# Patient Record
Sex: MALE | Race: BLACK OR AFRICAN AMERICAN | Employment: UNEMPLOYED | URBAN - METROPOLITAN AREA
[De-identification: names, ages, dates, MRNs, and addresses within clinical notes are randomized per-mention and may not be internally consistent; named-entity substitution may affect disease eponyms.]

---

## 2018-10-29 ENCOUNTER — APPOINTMENT (OUTPATIENT)
Dept: CT IMAGING | Age: 23
DRG: 176 | End: 2018-10-29

## 2018-10-29 ENCOUNTER — HOSPITAL ENCOUNTER (INPATIENT)
Age: 23
LOS: 2 days | Discharge: HOME | DRG: 176 | End: 2018-10-31
Attending: INTERNAL MEDICINE | Admitting: INTERNAL MEDICINE
Payer: MEDICAID

## 2018-10-29 DIAGNOSIS — I26.99 OTHER ACUTE PULMONARY EMBOLISM WITHOUT ACUTE COR PULMONALE (HCC): Primary | ICD-10-CM

## 2018-10-29 PROBLEM — R00.2 PALPITATIONS: Status: ACTIVE | Noted: 2018-10-29

## 2018-10-29 PROBLEM — R04.2 HEMOPTYSIS: Status: ACTIVE | Noted: 2018-10-29

## 2018-10-29 LAB
ANION GAP SERPL CALCULATED.3IONS-SCNC: 13 MMOL/L (ref 7–16)
BASOPHILS ABSOLUTE: 0.04 E9/L (ref 0–0.2)
BASOPHILS RELATIVE PERCENT: 0.5 % (ref 0–2)
BUN BLDV-MCNC: 12 MG/DL (ref 6–20)
CALCIUM SERPL-MCNC: 9.8 MG/DL (ref 8.6–10.2)
CHLORIDE BLD-SCNC: 98 MMOL/L (ref 98–107)
CO2: 27 MMOL/L (ref 22–29)
CREAT SERPL-MCNC: 1 MG/DL (ref 0.7–1.2)
D DIMER: 1596 NG/ML DDU
EOSINOPHILS ABSOLUTE: 0.18 E9/L (ref 0.05–0.5)
EOSINOPHILS RELATIVE PERCENT: 2.4 % (ref 0–6)
GFR AFRICAN AMERICAN: >60
GFR NON-AFRICAN AMERICAN: >60 ML/MIN/1.73
GLUCOSE BLD-MCNC: 90 MG/DL (ref 74–109)
HCT VFR BLD CALC: 46.2 % (ref 37–54)
HEMOGLOBIN: 14.5 G/DL (ref 12.5–16.5)
IMMATURE GRANULOCYTES #: 0.01 E9/L
IMMATURE GRANULOCYTES %: 0.1 % (ref 0–5)
LYMPHOCYTES ABSOLUTE: 2.89 E9/L (ref 1.5–4)
LYMPHOCYTES RELATIVE PERCENT: 38.6 % (ref 20–42)
MCH RBC QN AUTO: 23.1 PG (ref 26–35)
MCHC RBC AUTO-ENTMCNC: 31.4 % (ref 32–34.5)
MCV RBC AUTO: 73.7 FL (ref 80–99.9)
MONOCYTES ABSOLUTE: 0.5 E9/L (ref 0.1–0.95)
MONOCYTES RELATIVE PERCENT: 6.7 % (ref 2–12)
NEUTROPHILS ABSOLUTE: 3.86 E9/L (ref 1.8–7.3)
NEUTROPHILS RELATIVE PERCENT: 51.7 % (ref 43–80)
PDW BLD-RTO: 17.2 FL (ref 11.5–15)
PLATELET # BLD: 243 E9/L (ref 130–450)
PMV BLD AUTO: 10 FL (ref 7–12)
POTASSIUM SERPL-SCNC: 3.9 MMOL/L (ref 3.5–5)
RBC # BLD: 6.27 E12/L (ref 3.8–5.8)
SEDIMENTATION RATE, ERYTHROCYTE: 13 MM/HR (ref 0–15)
SODIUM BLD-SCNC: 138 MMOL/L (ref 132–146)
WBC # BLD: 7.5 E9/L (ref 4.5–11.5)

## 2018-10-29 PROCEDURE — 71275 CT ANGIOGRAPHY CHEST: CPT

## 2018-10-29 PROCEDURE — 85651 RBC SED RATE NONAUTOMATED: CPT

## 2018-10-29 PROCEDURE — 99285 EMERGENCY DEPT VISIT HI MDM: CPT

## 2018-10-29 PROCEDURE — 80048 BASIC METABOLIC PNL TOTAL CA: CPT

## 2018-10-29 PROCEDURE — 99223 1ST HOSP IP/OBS HIGH 75: CPT | Performed by: INTERNAL MEDICINE

## 2018-10-29 PROCEDURE — 93005 ELECTROCARDIOGRAM TRACING: CPT

## 2018-10-29 PROCEDURE — 85378 FIBRIN DEGRADE SEMIQUANT: CPT

## 2018-10-29 PROCEDURE — 6360000004 HC RX CONTRAST MEDICATION: Performed by: RADIOLOGY

## 2018-10-29 PROCEDURE — 2060000000 HC ICU INTERMEDIATE R&B

## 2018-10-29 PROCEDURE — 85025 COMPLETE CBC W/AUTO DIFF WBC: CPT

## 2018-10-29 PROCEDURE — 6370000000 HC RX 637 (ALT 250 FOR IP)

## 2018-10-29 RX ORDER — HYDROCODONE BITARTRATE AND ACETAMINOPHEN 5; 325 MG/1; MG/1
TABLET ORAL
Status: COMPLETED
Start: 2018-10-29 | End: 2018-10-29

## 2018-10-29 RX ORDER — HYDROCODONE BITARTRATE AND ACETAMINOPHEN 5; 325 MG/1; MG/1
1 TABLET ORAL ONCE
Status: COMPLETED | OUTPATIENT
Start: 2018-10-29 | End: 2018-10-29

## 2018-10-29 RX ORDER — HYDROCODONE BITARTRATE AND ACETAMINOPHEN 10; 325 MG/1; MG/1
1 TABLET ORAL EVERY 6 HOURS PRN
Status: DISCONTINUED | OUTPATIENT
Start: 2018-10-29 | End: 2018-10-31 | Stop reason: HOSPADM

## 2018-10-29 RX ADMIN — HYDROCODONE BITARTRATE AND ACETAMINOPHEN 1 TABLET: 5; 325 TABLET ORAL at 21:48

## 2018-10-29 RX ADMIN — IOPAMIDOL 70 ML: 755 INJECTION, SOLUTION INTRAVENOUS at 21:07

## 2018-10-29 ASSESSMENT — PAIN DESCRIPTION - ORIENTATION
ORIENTATION: RIGHT;MID
ORIENTATION: LEFT

## 2018-10-29 ASSESSMENT — PAIN DESCRIPTION - PAIN TYPE
TYPE: ACUTE PAIN
TYPE: ACUTE PAIN

## 2018-10-29 ASSESSMENT — PAIN SCALES - GENERAL
PAINLEVEL_OUTOF10: 5
PAINLEVEL_OUTOF10: 7
PAINLEVEL_OUTOF10: 7

## 2018-10-29 ASSESSMENT — PAIN DESCRIPTION - LOCATION
LOCATION: CHEST
LOCATION: RIB CAGE

## 2018-10-29 NOTE — ED PROVIDER NOTES
ED Attending  CC: Dayana         Department of Emergency Medicine   ED  Provider Note  Admit Date/RoomTime: 10/29/2018  7:41 PM  ED Room: 21/21  Chief Complaint   Rib Pain (left sided has been sick and coughing )    History of Present Illness   Source of history provided by:  patient. History/Exam Limitations: none. Irene Walter is a 26 y.o. old male who has a past medical history of: History reviewed. No pertinent past medical history. presents to the emergency department by private vehicle, for sharp left chest, anterior chest and left lateral chest pain which occured a few day(s) prior to arrival.  Patient states he initially had upper respiratory infection type symptoms a few days ago, which have resolved. He states for the past 2 days, he has noted pain with inspiration in the left anterior and left lateral chest wall. He also reports a few episodes of hemoptysis. He states he is also noted a feeling that his heart is \"racing. \" He reports shortness of breath with exertion. He states yesterday while playing basketball, he injured his left shoulder, and the pain has been worse since then. He also reports mild lightheadedness but denies syncope. He does admit to regular tobacco and marijuana use. ROS    Pertinent positives and negatives are stated within HPI, all other systems reviewed and are negative. Past Surgical History:  has no past surgical history on file. Social History:  reports that he has been smoking Cigarettes. He has never used smokeless tobacco. He reports that he does not drink alcohol or use drugs. Family History: family history is not on file. Allergies: Patient has no known allergies. Allergies: Patient has no known allergies.     Physical Exam           ED Triage Vitals [10/29/18 1940]   BP Temp Temp Source Pulse Resp SpO2 Height Weight   129/81 98.2 °F (36.8 °C) Oral 101 16 98 % 5' 11\" (1.803 m) 190 lb (86.2 kg)      Oxygen Saturation Interpretation: Normal.    Constitutional:  Alert, development consistent with age. HEENT:  NC/NT. Airway patent. Neck:  Normal ROM. Supple. Respiratory:  Clear to auscultation and breath sounds equal.  CV:  Regular rate and rhythm, normal heart sounds, without pathological murmurs, ectopy, gallops, or rubs. Chest:  left chest not tender to palpation, which does not reproduce pain. Crepitance: No.          Skin:  Without swelling, erythema or lesions noted. GI:  Abdomen Soft, nontender, good bowel sounds. No firm or pulsatile mass. Integument:  Normal turgor. Warm, dry, without visible rash, unless noted elsewhere. Extremities: No tenderness or edema noted. Lymphatic: no lymphadenopathy noted  Neurological:  Oriented. Motor functions intact.     Lab / Imaging Results   (All laboratory and radiology results have been personally reviewed by myself)  Labs:  Results for orders placed or performed during the hospital encounter of 10/29/18   CBC Auto Differential   Result Value Ref Range    WBC 7.5 4.5 - 11.5 E9/L    RBC 6.27 (H) 3.80 - 5.80 E12/L    Hemoglobin 14.5 12.5 - 16.5 g/dL    Hematocrit 46.2 37.0 - 54.0 %    MCV 73.7 (L) 80.0 - 99.9 fL    MCH 23.1 (L) 26.0 - 35.0 pg    MCHC 31.4 (L) 32.0 - 34.5 %    RDW 17.2 (H) 11.5 - 15.0 fL    Platelets 505 291 - 547 E9/L    MPV 10.0 7.0 - 12.0 fL    Neutrophils % 51.7 43.0 - 80.0 %    Immature Granulocytes % 0.1 0.0 - 5.0 %    Lymphocytes % 38.6 20.0 - 42.0 %    Monocytes % 6.7 2.0 - 12.0 %    Eosinophils % 2.4 0.0 - 6.0 %    Basophils % 0.5 0.0 - 2.0 %    Neutrophils # 3.86 1.80 - 7.30 E9/L    Immature Granulocytes # 0.01 E9/L    Lymphocytes # 2.89 1.50 - 4.00 E9/L    Monocytes # 0.50 0.10 - 0.95 E9/L    Eosinophils # 0.18 0.05 - 0.50 E9/L    Basophils # 0.04 0.00 - 0.20 V5/I   Basic Metabolic Panel   Result Value Ref Range    Sodium 138 132 - 146 mmol/L    Potassium 3.9 3.5 - 5.0 mmol/L    Chloride 98 98 - 107 mmol/L    CO2 27 22 - 29 mmol/L    Anion Gap 13 7 - 16

## 2018-10-30 ENCOUNTER — APPOINTMENT (OUTPATIENT)
Dept: ULTRASOUND IMAGING | Age: 23
DRG: 176 | End: 2018-10-30

## 2018-10-30 LAB
ANION GAP SERPL CALCULATED.3IONS-SCNC: 12 MMOL/L (ref 7–16)
APTT: 23.2 SEC (ref 24.5–35.1)
BASOPHILS ABSOLUTE: 0.03 E9/L (ref 0–0.2)
BASOPHILS RELATIVE PERCENT: 0.6 % (ref 0–2)
BUN BLDV-MCNC: 13 MG/DL (ref 6–20)
CALCIUM SERPL-MCNC: 9.4 MG/DL (ref 8.6–10.2)
CHLORIDE BLD-SCNC: 100 MMOL/L (ref 98–107)
CO2: 25 MMOL/L (ref 22–29)
CREAT SERPL-MCNC: 0.9 MG/DL (ref 0.7–1.2)
EOSINOPHILS ABSOLUTE: 0.34 E9/L (ref 0.05–0.5)
EOSINOPHILS RELATIVE PERCENT: 6.7 % (ref 0–6)
FIBRINOGEN: >700 MG/DL (ref 225–540)
GFR AFRICAN AMERICAN: >60
GFR NON-AFRICAN AMERICAN: >60 ML/MIN/1.73
GLUCOSE BLD-MCNC: 109 MG/DL (ref 74–109)
HCT VFR BLD CALC: 45.9 % (ref 37–54)
HEMOGLOBIN: 14.6 G/DL (ref 12.5–16.5)
IMMATURE GRANULOCYTES #: 0.01 E9/L
IMMATURE GRANULOCYTES %: 0.2 % (ref 0–5)
INR BLD: 1.1
LYMPHOCYTES ABSOLUTE: 2.53 E9/L (ref 1.5–4)
LYMPHOCYTES RELATIVE PERCENT: 49.8 % (ref 20–42)
MCH RBC QN AUTO: 23.3 PG (ref 26–35)
MCHC RBC AUTO-ENTMCNC: 31.8 % (ref 32–34.5)
MCV RBC AUTO: 73.2 FL (ref 80–99.9)
MONOCYTES ABSOLUTE: 0.49 E9/L (ref 0.1–0.95)
MONOCYTES RELATIVE PERCENT: 9.6 % (ref 2–12)
NEUTROPHILS ABSOLUTE: 1.68 E9/L (ref 1.8–7.3)
NEUTROPHILS RELATIVE PERCENT: 33.1 % (ref 43–80)
PDW BLD-RTO: 17.2 FL (ref 11.5–15)
PLATELET # BLD: 240 E9/L (ref 130–450)
PMV BLD AUTO: 10 FL (ref 7–12)
POTASSIUM SERPL-SCNC: 3.6 MMOL/L (ref 3.5–5)
PROTHROMBIN TIME: 12.6 SEC (ref 9.3–12.4)
RBC # BLD: 6.27 E12/L (ref 3.8–5.8)
SODIUM BLD-SCNC: 137 MMOL/L (ref 132–146)
WBC # BLD: 5.1 E9/L (ref 4.5–11.5)

## 2018-10-30 PROCEDURE — 81291 MTHFR GENE: CPT

## 2018-10-30 PROCEDURE — 6370000000 HC RX 637 (ALT 250 FOR IP): Performed by: STUDENT IN AN ORGANIZED HEALTH CARE EDUCATION/TRAINING PROGRAM

## 2018-10-30 PROCEDURE — 6360000002 HC RX W HCPCS: Performed by: INTERNAL MEDICINE

## 2018-10-30 PROCEDURE — 85384 FIBRINOGEN ACTIVITY: CPT

## 2018-10-30 PROCEDURE — 85303 CLOT INHIBIT PROT C ACTIVITY: CPT

## 2018-10-30 PROCEDURE — 85305 CLOT INHIBIT PROT S TOTAL: CPT

## 2018-10-30 PROCEDURE — 2060000000 HC ICU INTERMEDIATE R&B

## 2018-10-30 PROCEDURE — 6370000000 HC RX 637 (ALT 250 FOR IP): Performed by: INTERNAL MEDICINE

## 2018-10-30 PROCEDURE — 85025 COMPLETE CBC W/AUTO DIFF WBC: CPT

## 2018-10-30 PROCEDURE — 93308 TTE F-UP OR LMTD: CPT

## 2018-10-30 PROCEDURE — 86147 CARDIOLIPIN ANTIBODY EA IG: CPT

## 2018-10-30 PROCEDURE — 80048 BASIC METABOLIC PNL TOTAL CA: CPT

## 2018-10-30 PROCEDURE — 81400 MOPATH PROCEDURE LEVEL 1: CPT

## 2018-10-30 PROCEDURE — 85613 RUSSELL VIPER VENOM DILUTED: CPT

## 2018-10-30 PROCEDURE — 86146 BETA-2 GLYCOPROTEIN ANTIBODY: CPT

## 2018-10-30 PROCEDURE — 99233 SBSQ HOSP IP/OBS HIGH 50: CPT | Performed by: INTERNAL MEDICINE

## 2018-10-30 PROCEDURE — 81240 F2 GENE: CPT

## 2018-10-30 PROCEDURE — 81241 F5 GENE: CPT

## 2018-10-30 PROCEDURE — 85300 ANTITHROMBIN III ACTIVITY: CPT

## 2018-10-30 PROCEDURE — 85730 THROMBOPLASTIN TIME PARTIAL: CPT

## 2018-10-30 PROCEDURE — 36415 COLL VENOUS BLD VENIPUNCTURE: CPT

## 2018-10-30 PROCEDURE — 85610 PROTHROMBIN TIME: CPT

## 2018-10-30 PROCEDURE — 93970 EXTREMITY STUDY: CPT

## 2018-10-30 RX ADMIN — HYDROCODONE BITARTRATE AND ACETAMINOPHEN 1 TABLET: 10; 325 TABLET ORAL at 03:00

## 2018-10-30 RX ADMIN — HYDROCODONE BITARTRATE AND ACETAMINOPHEN 1 TABLET: 10; 325 TABLET ORAL at 04:33

## 2018-10-30 RX ADMIN — APIXABAN 10 MG: 5 TABLET, FILM COATED ORAL at 20:55

## 2018-10-30 RX ADMIN — HYDROCODONE BITARTRATE AND ACETAMINOPHEN 1 TABLET: 10; 325 TABLET ORAL at 20:05

## 2018-10-30 RX ADMIN — ENOXAPARIN SODIUM 90 MG: 100 INJECTION SUBCUTANEOUS at 08:40

## 2018-10-30 ASSESSMENT — PAIN DESCRIPTION - PAIN TYPE: TYPE: ACUTE PAIN

## 2018-10-30 ASSESSMENT — PAIN DESCRIPTION - ONSET: ONSET: ON-GOING

## 2018-10-30 ASSESSMENT — PAIN SCALES - GENERAL
PAINLEVEL_OUTOF10: 9
PAINLEVEL_OUTOF10: 7
PAINLEVEL_OUTOF10: 7
PAINLEVEL_OUTOF10: 2
PAINLEVEL_OUTOF10: 7

## 2018-10-30 ASSESSMENT — PAIN DESCRIPTION - PROGRESSION: CLINICAL_PROGRESSION: NOT CHANGED

## 2018-10-30 ASSESSMENT — PAIN DESCRIPTION - FREQUENCY: FREQUENCY: CONTINUOUS

## 2018-10-30 ASSESSMENT — PAIN DESCRIPTION - LOCATION: LOCATION: BACK;SHOULDER

## 2018-10-30 ASSESSMENT — PAIN DESCRIPTION - DESCRIPTORS: DESCRIPTORS: ACHING;CONSTANT;DISCOMFORT

## 2018-10-30 ASSESSMENT — PAIN DESCRIPTION - ORIENTATION: ORIENTATION: RIGHT;LEFT;MID

## 2018-10-30 NOTE — CONSULTS
Value Date     10/30/2018    K 3.6 10/30/2018     10/30/2018    CO2 25 10/30/2018    BUN 13 10/30/2018    CREATININE 0.9 10/30/2018    CALCIUM 9.4 10/30/2018    GFRAA >60 10/30/2018    LABGLOM >60 10/30/2018     Lab Results   Component Value Date    PROTIME 12.6 10/30/2018    INR 1.1 10/30/2018           Assessment    Active Problems:    Other pulmonary embolism without acute cor pulmonale (HCC)    Palpitations    Hemoptysis    Pulmonary infarction ([address]    Multiple pulmonary emboli (HCC)  Resolved Problems:    * No resolved hospital problems. *    Plan    - Started on therapeutic Lovenox - switched regimen to a Eliquis 10 mg BID x 7 days followed by 5 mg BID for long-term anticoagulation  - DVT ultrasound - negative  - Follow up echo results to rule out pulmonary hypertension  - Follow-up genetic testing results for coagulopathies  - Pain management per primary  - Incentive spirometry    Discussed case with Ruffin Sever, MD  Family Medicine, PGY-2  10/30/2018   5:16 PM      Thank you for allowing me to participate in the care of [facility]. I personally saw, examined, and cared for the patient. Labs, medications, radiographs reviewed. I agree with history exam and plans detailed in Medical Student note.   Alfa Dee D.O.

## 2018-10-30 NOTE — H&P
No results found for: CKTOTAL, CKMB, CKMBINDEX, [address]    Radiology: Cta Pulmonary W Contrast    Result Date: 10/29/2018  Patient MRN: [id number] : 1995 Age:  26 years Order Date: 10/29/2018 8:00 PM Gender: Male Exam: CTA PULMONARY W CONTRAST. No 3-D postprocessing was performed. Number of Images: 417 Indication:   Rule out pulmonary embolism Comparison: None. Findings: This examination was performed on a CT scanner with dose reduction. Technique: Low-dose CT  acquisition technique included one of following options; 1 . Automated exposure control, 2. Adjustment of MA and or KV according to patient's size or 3. Use of iterative reconstruction. There are multiple areas of pulmonary artery embolus/thrombus extending toward the right anterior lung, the right midlung and the right posterior and lower lung. Multiple areas of pulmonary artery/thrombus extending toward the left anterior lung, left midlung and left posterior and lower lung. No right ventricular strain definitively identified at this time. No supraclavicular, axillary, or mediastinal lymphadenopathy by CT size criteria. Soft tissue density in the anterior prevascular space likely reflective of residual thymic tissue given the patient's age. No definitive precarinal or hilar abnormal enlarged lymphadenopathy is identified. Visualized portions of the gallbladder, liver, esophagus, stomach, spleen, accessory spleen, adrenals, left kidney and right kidney and the pancreas, pancreatic duct and extrahepatic common bile duct that are visualized are unremarkable. Vertebral body heights and intervertebral disc spaces are well-preserved. There is normal sagittal alignment of the visualized spine. No lytic or blastic bony lesions. Thyroid is grossly unremarkable. Right lun. No focal area of infiltrate/pneumonia, pneumothorax or pleural effusion is identified. 2. No discrete noncalcified pulmonary nodule or spiculated mass identified. Left lun.  No focal area of infiltrate/pneumonia, pneumothorax or pleural effusion. 2. There is an area of airspace opacification which measures approximately 3.9 cm anterior posterior by 3.3 cm transverse by approximately 2.4 cm craniocaudal noted in the left lower lung and costophrenic angle laterally. ALERT:  THIS IS AN ABNORMAL REPORT. Findings communicated directly with Isabel Araujo on 10/29/2018 at approximately 9:17 PM, and Dr. Dennis Kauffman at approximately 2121 hours on 12/29/2018. 1. Multiple pulmonary emboli/thrombus noted within multiple pulmonary arteries involving the right lung pulmonary vasculature and the left lung pulmonary vasculature extending toward the bilateral anterior, bilateral mid and bilateral posterior lungs. No right ventricular strain identify the time of the exam. 2. Area of airspace opacification which is pleural-based in the inferior left lateral lower lobe which could be suggestive of a very parenchymal infarction. Continued follow-up to complete resolution is recommended. 3. Findings suggestive of residual thymic tissue in the anterior prevascular space. EKG:  Normal sinus rhythm  Possible Left atrial enlargement  Borderline ECG  No previous ECGs available   Assessment & Plan   ACTIVE hospital problems being addressed/reassessed for this admission:  Active Problems:    Other pulmonary embolism without acute cor pulmonale (HCC)    Palpitations    Hemoptysis    Pulmonary infarction [facility])  Resolved Problems:    * No resolved hospital problems. *    Code status/DVT prophylaxis and PLAN --see orders   Note extensive time spent coordinating care between ER docs, ER and floor nurses, and transitioning care over to day providers  Plan of care/ clinical impressions/communication specifics detailed below:    No family hx of blood clots or risk factors  No DVT symptoms LE-- no fevers or endocardities and esr N.   Acute PE multiple with pleuritic like-- pulm infarction pain and hemoptosis over past

## 2018-10-30 NOTE — CARE COORDINATION
10/30/2018  Social Work Discharge Planning:SW left a voicemail with  Nick Gong to inform that Pt has no health ins. MIGUE also gave Pt the contact info. for PCP pre-service-[phone]-, to set up an appointment with a PCP. Electronically signed by FARIDEH Garcia on 10/30/2018 at 11:02 AM     10/30/2018  Social Work Discharge Planning:  MIGUE was notified by Debbie Oscar. Adv. that Pt will be signing up for medicaid and is eligible for medication assistance. Electronically signed by FARIDEH Garcia on 10/30/2018 at 11:35 AM

## 2018-10-31 ENCOUNTER — APPOINTMENT (OUTPATIENT)
Dept: CT IMAGING | Age: 23
DRG: 176 | End: 2018-10-31

## 2018-10-31 VITALS
OXYGEN SATURATION: 98 % | HEART RATE: 93 BPM | WEIGHT: 189.6 LBS | DIASTOLIC BLOOD PRESSURE: 78 MMHG | HEIGHT: 71 IN | RESPIRATION RATE: 16 BRPM | BODY MASS INDEX: 26.54 KG/M2 | SYSTOLIC BLOOD PRESSURE: 129 MMHG | TEMPERATURE: 97.9 F

## 2018-10-31 LAB
ANION GAP SERPL CALCULATED.3IONS-SCNC: 12 MMOL/L (ref 7–16)
AT-III ACTIVITY: 91 % ACTIVITY (ref 83–121)
BUN BLDV-MCNC: 12 MG/DL (ref 6–20)
CALCIUM SERPL-MCNC: 9.4 MG/DL (ref 8.6–10.2)
CHLORIDE BLD-SCNC: 98 MMOL/L (ref 98–107)
CO2: 26 MMOL/L (ref 22–29)
CREAT SERPL-MCNC: 0.9 MG/DL (ref 0.7–1.2)
GFR AFRICAN AMERICAN: >60
GFR NON-AFRICAN AMERICAN: >60 ML/MIN/1.73
GLUCOSE BLD-MCNC: 106 MG/DL (ref 74–109)
HCT VFR BLD CALC: 47 % (ref 37–54)
IMMATURE RETIC FRACT: 14.6 % (ref 2.3–13.4)
LUPUS ANTICOAG DVVT: ABNORMAL
POTASSIUM SERPL-SCNC: 4 MMOL/L (ref 3.5–5)
PROTEIN C ACTIVITY: 84 % ACTIVITY (ref 68–165)
RETIC HGB EQUIVALENT: 26.5 PG (ref 28.2–36.6)
RETICULOCYTE ABSOLUTE COUNT: 0.05 E12/L
RETICULOCYTE COUNT PCT: 0.9 % (ref 0.4–1.9)
SODIUM BLD-SCNC: 136 MMOL/L (ref 132–146)

## 2018-10-31 PROCEDURE — APPSS30 APP SPLIT SHARED TIME 16-30 MINUTES: Performed by: NURSE PRACTITIONER

## 2018-10-31 PROCEDURE — 82105 ALPHA-FETOPROTEIN SERUM: CPT

## 2018-10-31 PROCEDURE — 84702 CHORIONIC GONADOTROPIN TEST: CPT

## 2018-10-31 PROCEDURE — 99239 HOSP IP/OBS DSCHRG MGMT >30: CPT | Performed by: INTERNAL MEDICINE

## 2018-10-31 PROCEDURE — 36415 COLL VENOUS BLD VENIPUNCTURE: CPT

## 2018-10-31 PROCEDURE — 6370000000 HC RX 637 (ALT 250 FOR IP): Performed by: INTERNAL MEDICINE

## 2018-10-31 PROCEDURE — 80048 BASIC METABOLIC PNL TOTAL CA: CPT

## 2018-10-31 PROCEDURE — 85045 AUTOMATED RETICULOCYTE COUNT: CPT

## 2018-10-31 PROCEDURE — 6370000000 HC RX 637 (ALT 250 FOR IP): Performed by: STUDENT IN AN ORGANIZED HEALTH CARE EDUCATION/TRAINING PROGRAM

## 2018-10-31 PROCEDURE — 74177 CT ABD & PELVIS W/CONTRAST: CPT

## 2018-10-31 PROCEDURE — 6360000004 HC RX CONTRAST MEDICATION: Performed by: RADIOLOGY

## 2018-10-31 RX ADMIN — APIXABAN 10 MG: 5 TABLET, FILM COATED ORAL at 20:52

## 2018-10-31 RX ADMIN — IOPAMIDOL 110 ML: 755 INJECTION, SOLUTION INTRAVENOUS at 10:07

## 2018-10-31 RX ADMIN — HYDROCODONE BITARTRATE AND ACETAMINOPHEN 1 TABLET: 10; 325 TABLET ORAL at 11:21

## 2018-10-31 RX ADMIN — APIXABAN 10 MG: 5 TABLET, FILM COATED ORAL at 11:20

## 2018-10-31 RX ADMIN — IOHEXOL 50 ML: 240 INJECTION, SOLUTION INTRATHECAL; INTRAVASCULAR; INTRAVENOUS; ORAL at 10:07

## 2018-10-31 ASSESSMENT — PAIN SCALES - GENERAL
PAINLEVEL_OUTOF10: 0
PAINLEVEL_OUTOF10: 7
PAINLEVEL_OUTOF10: 0
PAINLEVEL_OUTOF10: 0

## 2018-10-31 ASSESSMENT — PAIN DESCRIPTION - LOCATION: LOCATION: SHOULDER

## 2018-10-31 ASSESSMENT — PAIN DESCRIPTION - DESCRIPTORS: DESCRIPTORS: BURNING;DISCOMFORT;DULL

## 2018-10-31 ASSESSMENT — PAIN DESCRIPTION - PAIN TYPE: TYPE: ACUTE PAIN

## 2018-10-31 ASSESSMENT — PAIN DESCRIPTION - ORIENTATION: ORIENTATION: LEFT

## 2018-10-31 NOTE — PROGRESS NOTES
[facility] Progress Note    Admitting Date and Time: 10/29/2018  7:41 PM  Admit Dx: Multiple pulmonary emboli ([address] [I26.99]  Multiple pulmonary emboli ([address] [I26.99]    Subjective:  Patient is being followed for Multiple pulmonary emboli ([address] [I26.99]  Multiple pulmonary emboli ([address] [I26.99]   Patient seen resting in bed, no acute distress. 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  apixaban  10 mg Oral BID       HYDROcodone-acetaminophen 1 tablet Q6H PRN        Objective:    /78   Pulse 93   Temp 97.9 °F (36.6 °C) (Oral)   Resp 16   Ht 5' 11\" (1.803 m)   Wt 189 lb 9.6 oz (86 kg)   SpO2 98%   BMI 26.44 kg/m²     General Appearance: alert and oriented to person, place and time and in no acute distress  Skin: warm and dry  Head: normocephalic and atraumatic  Eyes: pupils equal, round, and reactive to light, extraocular eye movements intact, conjunctivae normal  Neck: neck supple and non tender without mass   Pulmonary/Chest: clear to auscultation bilaterally- no wheezes, rales or rhonchi, normal air movement, no respiratory distress. On room air.    Cardiovascular: normal rate, normal S1 and S2   Abdomen: soft, non-tender, non-distended, normal bowel sounds, no masses or organomegaly  Extremities: no cyanosis, no clubbing and no edema  Neurologic: speech normal        Recent Labs      10/29/18   2003  10/30/18   0435  10/31/18   1515   NA  138  137  136   K  3.9  3.6  4.0   CL  98  100  98   CO2  27  25  26   BUN  12  13  12   CREATININE  1.0  0.9  0.9   GLUCOSE  90  109  106   CALCIUM  9.8  9.4  9.4       Recent Labs      10/29/18   2003  10/30/18   0435  10/31/18   0346   WBC  7.5  5.1   --    RBC  6.27*  6.27*   --    HGB  14.5  14.6   --    HCT  46.2  45.9  47.0   MCV  73.7*  73.2*   --
P Quality Flow/Interdisciplinary Rounds Progress Note        Quality Flow Rounds held on October 31, 2018    Disciplines Attending:  Bedside Nurse, ,  and Nursing Unit 6[address] Sharlene Kitchen was admitted on 10/29/2018  7:41 PM    Anticipated Discharge Date:  Expected Discharge Date: 11/01/18    Disposition:    Otis Score:  Otis Scale Score: 22    Readmission Risk              Risk of Unplanned Readmission:        7           Discussed patient goal for the day, patient clinical progression, and barriers to discharge.   The following Goal(s) of the Day/Commitment(s) have been identified:  Other  testing today      Cynthia Egan  October 31, 2018
PULMONARY PROGRESS NOTES    Reason for Consultation: Pulmonary embolism  Referring Physician: Dr. Jerry Collins with the referring physician will be sent via the electronic medical record. Subjective: Patient reports that right-sided chest pain is improved but still has some when pain medication wears off. She has not had feelings of palpitations since early this morning. He denies fever, SOB. He was switched from Lovenox to Eliquis yesterday. History reviewed. No pertinent past medical history. History reviewed. No pertinent surgical history. History reviewed. No pertinent family history. Social History     Social History    Marital status: Single     Spouse name: N/A    Number of children: N/A    Years of education: N/A     Occupational History    Not on file. Social History Main Topics    Smoking status: Current Every Day Smoker     Types: Cigarettes    Smokeless tobacco: Never Used    Alcohol use No    Drug use: No    Sexual activity: Not on file     Other Topics Concern    Not on file     Social History Narrative    No narrative on file       Current Facility-Administered Medications   Medication Dose Route Frequency Provider Last Rate Last Dose    apixaban (ELIQUIS) tablet 10 mg  10 mg Oral BID Lorie Talamantes MD   10 mg at 10/31/18 1120    HYDROcodone-acetaminophen (NORCO)  MG per tablet 1 tablet  1 tablet Oral Q6H PRN Chi Hutchins MD   1 tablet at 10/31/18 1121       No Known Allergies    Review of Systems:  Refer to HPI. Physical Exam:  /69   Pulse 91   Temp 98.2 °F (36.8 °C) (Oral)   Resp 16   Ht 5' 11\" (1.803 m)   Wt 189 lb 9.6 oz (86 kg)   SpO2 98%   BMI 26.44 kg/m²    General: The patient is lying in bed comfortably without any distress.   Breathing is not labored  Neck: supple without adenopathy  Cardiovascular: regular rate and rhythm without murmur or gallop  Respiratory: Clear to auscultation bilaterally without
rigidity. Breast/Rectal/Genitourinary: not pertinent. Extremities:  Negative for lower extremity edema  Skin:  Warm and dry  Vascular: 2/4 Dorsalis Pedis pulses bilaterally. Neuro:  Cranial nerves 2-12 grossly intact, no focal weakness or change in sensation noted. Extraocular muscles intact. Pupils equal, round, reactive to light. I agree with the assessment and plan of FREDY Vides. Acute PE  Pulmonary infarction  Palpitations  Chest pain related to PE    Electronically signed by Jay De La Torre D.O.   Hospitalist  [facility] [place] [address]

## 2018-11-01 LAB
BETA-2 GLYCOPROTEIN 1 IGG ANTIBODY: 0 SGU (ref 0–20)
BETA-2 GLYCOPROTEIN 1 IGM ANTIBODY: 0 SMU (ref 0–20)

## 2018-11-01 NOTE — DISCHARGE SUMMARY
air.   Cardiovascular: normal rate, normal S1 and S2   Abdomen: soft, non-tender, non-distended, normal bowel sounds, no masses or organomegaly  Extremities: no cyanosis, no clubbing and no edema  Neurologic: speech normal    I/O last 3 completed shifts: In: 360 [P.O.:360]  Out: -   I/O this shift:  In: 180 [P.O.:180]  Out: -       LABS:  Recent Labs      10/29/18   2003  10/30/18   0435  10/31/18   1515   NA  138  137  136   K  3.9  3.6  4.0   CL  98  100  98   CO2  27  25  26   BUN  12  13  12   CREATININE  1.0  0.9  0.9   GLUCOSE  90  109  106   CALCIUM  9.8  9.4  9.4       Recent Labs      10/29/18   2003  10/30/18   0435  10/31/18   0346   WBC  7.5  5.1   --    RBC  6.27*  6.27*   --    HGB  14.5  14.6   --    HCT  46.2  45.9  47.0   MCV  73.7*  73.2*   --    MCH  23.1*  23.3*   --    MCHC  31.4*  31.8*   --    RDW  17.2*  17.2*   --    PLT  243  240   --    MPV  10.0  10.0   --        No results for input(s): POCGLU in the last 72 hours. Imaging:  Cta Pulmonary W Contrast    Result Date: 10/29/2018  Patient MRN: [id number] : 1995 Age:  26 years Order Date: 10/29/2018 8:00 PM Gender: Male Exam: CTA PULMONARY W CONTRAST. No 3-D postprocessing was performed. Number of Images: 417 Indication:   Rule out pulmonary embolism Comparison: None. Findings: This examination was performed on a CT scanner with dose reduction. Technique: Low-dose CT  acquisition technique included one of following options; 1 . Automated exposure control, 2. Adjustment of MA and or KV according to patient's size or 3. Use of iterative reconstruction. There are multiple areas of pulmonary artery embolus/thrombus extending toward the right anterior lung, the right midlung and the right posterior and lower lung. Multiple areas of pulmonary artery/thrombus extending toward the left anterior lung, left midlung and left posterior and lower lung. No right ventricular strain definitively identified at this time.  No supraclavicular, axillary, or mediastinal lymphadenopathy by CT size criteria. Soft tissue density in the anterior prevascular space likely reflective of residual thymic tissue given the patient's age. No definitive precarinal or hilar abnormal enlarged lymphadenopathy is identified. Visualized portions of the gallbladder, liver, esophagus, stomach, spleen, accessory spleen, adrenals, left kidney and right kidney and the pancreas, pancreatic duct and extrahepatic common bile duct that are visualized are unremarkable. Vertebral body heights and intervertebral disc spaces are well-preserved. There is normal sagittal alignment of the visualized spine. No lytic or blastic bony lesions. Thyroid is grossly unremarkable. Right lun. No focal area of infiltrate/pneumonia, pneumothorax or pleural effusion is identified. 2. No discrete noncalcified pulmonary nodule or spiculated mass identified. Left lun. No focal area of infiltrate/pneumonia, pneumothorax or pleural effusion. 2. There is an area of airspace opacification which measures approximately 3.9 cm anterior posterior by 3.3 cm transverse by approximately 2.4 cm craniocaudal noted in the left lower lung and costophrenic angle laterally. ALERT:  THIS IS AN ABNORMAL REPORT. Findings communicated directly with Carlos Pride on 10/29/2018 at approximately 9:17 PM, and Dr. Brooklyn Ervin at approximately 2121 hours on 2018. 1. Multiple pulmonary emboli/thrombus noted within multiple pulmonary arteries involving the right lung pulmonary vasculature and the left lung pulmonary vasculature extending toward the bilateral anterior, bilateral mid and bilateral posterior lungs. No right ventricular strain identify the time of the exam. 2. Area of airspace opacification which is pleural-based in the inferior left lateral lower lobe which could be suggestive of a very parenchymal infarction. Continued follow-up to complete resolution is recommended.  3. Findings suggestive of residual thymic tissue in the anterior prevascular space. Us Dup Lower Extremities Bilateral Venous    Result Date: 10/30/2018  Patient MRN:  [id number] : 1995 Age: 26 years Gender: Male Order Date:  10/30/2018 12:30 PM EXAM: US DUP LOWER EXTREMITIES BILATERAL VENOUS NUMBER OF IMAGES:  62 INDICATION: Chest pain, history of PE, r/o DVT COMPARISON: None FINDINGS: There is no evidence for deep venous thrombosis There is good compressibility, there is good augmentation, there is good color flow. No evidence of a DVT in the bilateral lower extremities. Patient Instructions:      Medication List      START taking these medications    apixaban 5 MG Tabs tablet  Commonly known as:  ELIQUIS  Take two tabs by mouth twice a day from  through  then one tablet orally twice a day starting 18. Where to Get Your Medications      These medications were sent to [address] [place] [id number]    Phone:  [phone]   · apixaban 5 MG Tabs tablet           Note that more than 30 minutes was spent in preparing discharge papers, discussing discharge with patient, medication review, etc.    Signed:  Electronically signed by LUCIANA Garcia CNP on 2018 at 11:16 AM   HOSPITALIST ATTENDING PHYSICIAN NOTE 2018 1122PM:    Details of the evaluation - subjective assessment (including medication profile, past medical, family and social history when applicable), examination, review of lab and test data, diagnostic impressions and medical decision making - performed by LUCIANA Garcia CNP, were discussed with me on the date of service and I agree with clinical information herein unless otherwise noted. The patient has been evaluated by me personally prior to discharge.  Please see progress note of 10/31/18 for discharge interview and exam.    I agree with the assessment

## 2018-11-02 LAB
AFP-TUMOR MARKER: 1 NG/ML (ref 0–9)
ANTICARDIOLIPIN IGA ANTIBODY: 3 APL (ref 0–11)
ANTICARDIOLIPIN IGG ANTIBODY: 3 GPL (ref 0–14)
CARDIOLIPIN AB IGM: 0 MPL (ref 0–12)
EKG ATRIAL RATE: 96 BPM
EKG P AXIS: 52 DEGREES
EKG P-R INTERVAL: 146 MS
EKG Q-T INTERVAL: 350 MS
EKG QRS DURATION: 90 MS
EKG QTC CALCULATION (BAZETT): 442 MS
EKG R AXIS: 45 DEGREES
EKG T AXIS: 0 DEGREES
EKG VENTRICULAR RATE: 96 BPM
HCG TUMOR MARKER: <1 IU/L (ref 0–3)
PROTEIN S, FUNCTIONAL: 101 % (ref 66–143)

## 2018-11-08 LAB — THROMBOPHILIA DNA ASSAY: NORMAL

## 2020-12-19 ENCOUNTER — HOSPITAL ENCOUNTER (EMERGENCY)
Age: 25
Discharge: HOME | End: 2020-12-19

## 2020-12-19 ENCOUNTER — APPOINTMENT (OUTPATIENT)
Dept: GENERAL RADIOLOGY | Age: 25
End: 2020-12-19

## 2020-12-19 VITALS — OXYGEN SATURATION: 97 % | HEART RATE: 75 BPM | TEMPERATURE: 98.5 F

## 2020-12-19 PROCEDURE — 99283 EMERGENCY DEPT VISIT LOW MDM: CPT

## 2020-12-19 PROCEDURE — 73630 X-RAY EXAM OF FOOT: CPT

## 2020-12-19 PROCEDURE — 6370000000 HC RX 637 (ALT 250 FOR IP): Performed by: NURSE PRACTITIONER

## 2020-12-19 RX ORDER — IBUPROFEN 800 MG/1
800 TABLET ORAL EVERY 8 HOURS PRN
Qty: 30 TABLET | Refills: 0 | Status: SHIPPED | OUTPATIENT
Start: 2020-12-19

## 2020-12-19 RX ORDER — IBUPROFEN 800 MG/1
800 TABLET ORAL ONCE
Status: COMPLETED | OUTPATIENT
Start: 2020-12-19 | End: 2020-12-19

## 2020-12-19 RX ADMIN — IBUPROFEN 800 MG: 800 TABLET, FILM COATED ORAL at 14:31

## 2020-12-19 ASSESSMENT — PAIN DESCRIPTION - ORIENTATION: ORIENTATION: LEFT

## 2020-12-19 ASSESSMENT — PAIN SCALES - GENERAL
PAINLEVEL_OUTOF10: 10
PAINLEVEL_OUTOF10: 10

## 2020-12-19 ASSESSMENT — PAIN DESCRIPTION - PAIN TYPE: TYPE: ACUTE PAIN

## 2020-12-19 ASSESSMENT — PAIN DESCRIPTION - LOCATION: LOCATION: FOOT

## 2020-12-19 NOTE — ED PROVIDER NOTES
[address]  Department of Emergency Medicine   ED  Encounter Note  Admit Date/RoomTime: 2020 12:58 PM  ED Room: [facility]    NAME: [facility]  : 1995  MRN: [id number]     Chief Complaint:  Foot Injury (Patient was playing basketball yesterday, tripped on foot and injured left big toe )    History of Present 7719  35 Balbir is a 26 y.o. old male presenting to the emergency department by private vehiclefor traumatic left pain which occured 1 day(s) prior to arrival.  The complaint is due to stubbing it while playing basketball yesterday. Since onset the symptoms have been worsening. Patient has no prior history of pain/injury with regards to today's visit. His pain is aggraveated by movement, use and palpation and relieved by nothing. The complaint occurred while     Tetanus Status: up to date. ROS   Pertinent positives and negatives are stated within HPI, all other systems reviewed and are negative. Past Medical History:  has no past medical history on file. Surgical History:  has no past surgical history on file. Social History:  reports that he has been smoking cigarettes. He has never used smokeless tobacco. He reports that he does not drink alcohol or use drugs. Family History: family history is not on file. Allergies: Patient has no known allergies. Physical Exam   Oxygen Saturation Interpretation: Normal.        ED Triage Vitals [20 1256]   BP Temp Temp src Pulse Resp SpO2 Height Weight   -- 98.5 °F (36.9 °C) -- 75 -- 97 % -- --         Constitutional:  Alert, development consistent with age. Neck:  Normal ROM. Supple. Toe(s):   Left great toe. Tenderness: moderate. Swelling: Mild. Deformity: no.              ROM: full range with pain. Skin:  no erythema, rash or wounds noted. Neurovascular: Motor deficit: none.                Sensory deficit: none.             Pulse deficit: none. Capillary refill: normal.  Foot:             Tenderness:  none. Swelling: None. Deformity: no.            ROM: full range of motion. Skin:  no erythema, rash or wounds noted. Gait:  limp due to affected limb. Lymphatics: No lymphangitis or adenopathy noted. Neurological:  Oriented. Motor functions intact. .    Lab / Imaging Results   (All laboratory and radiology results have been personally reviewed by myself)  Labs:  No results found for this visit on 12/19/20. Imaging: All Radiology results interpreted by Radiologist unless otherwise noted. XR FOOT LEFT (MIN 3 VIEWS)   Final Result   Nondisplaced fracture through the cortex of the distal medial 1st proximal   phalanx, extending to the interphalangeal joint articular surface. ED Course / Medical Decision Making     Medications   ibuprofen (ADVIL;MOTRIN) tablet 800 mg (800 mg Oral Given 12/19/20 1431)        Re-examination:  12/19/20       Time:        Consult(s):   None    Procedure(s):   none    MDM:    Imaging was obtained based on moderate suspicion for fracture, dislocation as per history/physical findings. Plan is subsequently for symptom control, limited weight-bearing and  immobilization with appropriate outpatient follow-up. Patient's x-ray did show a nondisplaced fracture of his distal medial first proximal phalanx. He will be discharged at this time. Patient's great toe and second digit were anamaria taped and an Ace wrap was applied. He is to follow-up with primary care provider. I did inform him if symptoms worsen he is return to the emergency room immediately. Patient was also given a prescription for 800 mg ibuprofen. Plan of Care/Counseling:  I reviewed today's visit with the patient in addition to providing specific details for the plan of care and counseling regarding the diagnosis and prognosis.   Questions are answered at this time and are agreeable with the plan. Assessment      1. Closed nondisplaced fracture of proximal phalanx of left great toe, initial encounter      Plan   Discharge to home  Patient condition is good    New Medications     Discharge Medication List as of 12/19/2020  2:23 PM      START taking these medications    Details   ibuprofen (IBU) 800 MG tablet Take 1 tablet by mouth every 8 hours as needed for Pain Take with food. , Disp-30 tablet, R-0Print           Electronically signed by LUCIANA Hunt NP   DD: 12/19/20  **This report was transcribed using voice recognition software. Every effort was made to ensure accuracy; however, inadvertent computerized transcription errors may be present.   END OF ED PROVIDER NOTE        LUCIANA Treviño NP  12/19/20 0700

## 2024-11-05 ENCOUNTER — HOSPITAL ENCOUNTER (EMERGENCY)
Facility: HOSPITAL | Age: 29
Discharge: HOME | End: 2024-11-05
Attending: STUDENT IN AN ORGANIZED HEALTH CARE EDUCATION/TRAINING PROGRAM
Payer: COMMERCIAL

## 2024-11-05 VITALS
HEART RATE: 85 BPM | DIASTOLIC BLOOD PRESSURE: 67 MMHG | BODY MASS INDEX: 30.8 KG/M2 | SYSTOLIC BLOOD PRESSURE: 133 MMHG | WEIGHT: 220 LBS | TEMPERATURE: 99.1 F | OXYGEN SATURATION: 99 % | HEIGHT: 71 IN | RESPIRATION RATE: 18 BRPM

## 2024-11-05 DIAGNOSIS — U07.1 COVID: Primary | ICD-10-CM

## 2024-11-05 PROCEDURE — 99282 EMERGENCY DEPT VISIT SF MDM: CPT

## 2024-11-05 PROCEDURE — 2500000001 HC RX 250 WO HCPCS SELF ADMINISTERED DRUGS (ALT 637 FOR MEDICARE OP): Performed by: STUDENT IN AN ORGANIZED HEALTH CARE EDUCATION/TRAINING PROGRAM

## 2024-11-05 RX ORDER — OXYMETAZOLINE HCL 0.05 %
2 SPRAY, NON-AEROSOL (ML) NASAL ONCE
Status: COMPLETED | OUTPATIENT
Start: 2024-11-05 | End: 2024-11-05

## 2024-11-05 ASSESSMENT — LIFESTYLE VARIABLES
EVER HAD A DRINK FIRST THING IN THE MORNING TO STEADY YOUR NERVES TO GET RID OF A HANGOVER: NO
TOTAL SCORE: 0
HAVE PEOPLE ANNOYED YOU BY CRITICIZING YOUR DRINKING: NO
EVER FELT BAD OR GUILTY ABOUT YOUR DRINKING: NO
HAVE YOU EVER FELT YOU SHOULD CUT DOWN ON YOUR DRINKING: NO

## 2024-11-06 NOTE — ED PROVIDER NOTES
History of Present Illness     History provided by: Patient and Friend  Limitations to History: None  External Records Reviewed with Brief Summary: None    HPI:  Ronald Keller is a 29 y.o. 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  Patient states that he developed symptoms of congestion and fatigue about 5 days ago.  He was having chills and fevers.  States he went to a outside ER where he was swabbed and positive for COVID.  States his primary concern coming in here is having difficulty breathing through his nose.  He has tried multiple different medications including Sudafed, Flonase, Afrin, and a few other over the counter medications.  Denies any chest pain.  No nausea vomit.  No diarrhea or urinary changes.    Physical Exam   Triage vitals:  T 37.3 °C (99.1 °F)  HR 97  /70  RR 18  O2 99 % None (Room air)    General: Well appearing and in no acute distress.  HEENT: NCAT. PERRL. Tms gray and pearly bilaterally. Oropharynx pink and moist.  CV: Regular rate, regular rhythm.   Resp: Normal effort. CTAB.  GI: Soft.   Extremities: No lower extremity edema.  Neuro: Moving all extremities bilaterally.  Psych: Appropriate mood and affect    Medical Decision Making & ED Course   Medical Decision Making:  This is a 29 y.o. 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  Patient notes having congestion not relieved by over-the-counter medications.  Tried numerous medications at home.  Exam is well-appearing.  His exam is consistent with a viral URI like COVID.  I have a low suspicion for something such as PE or myocarditis as his primary complaint is sinus congestion and not SOB/chest pain.  I did advise him on symptomatic control at home.  Gave him some Afrin here.  Advised return precautions and discharge.  ----    Differential diagnoses considered include but are not limited to: otitis media, sinusitis, pe, myocarditis, pneumonia      Social Determinants of Health which Significantly Impact Care: None identified     EKG Independent Interpretation: EKG not obtained    Independent Result Review and Interpretation: Relevant laboratory and radiographic results were reviewed and independently interpreted by myself.  As necessary, they are commented on in the ED Course.    Chronic conditions affecting the patient's care: As documented above in [facility]    The patient was discussed with the following consultants/services: None    Care Considerations: As documented above in [facility]    ED Course:  Diagnoses as of 11/05/24 2158   COVID     Disposition   As a result of the work-up, the patient was discharged home.  he was informed of his diagnosis and instructed to come back with any concerns or worsening of condition.  he and was agreeable to the plan as discussed above.  he was given the opportunity to ask questions.  All of the patient's questions were answered.    Keith Salgado MD  Emergency Medicine     Keith Salgado MD  11/05/24 2158

## 2024-11-06 NOTE — ED TRIAGE NOTES
Pt presents to ED with c/o congestion and fever. Pt was dx with covid yesterday. Pt has hx of PE.

## 2025-07-06 ENCOUNTER — APPOINTMENT (OUTPATIENT)
Dept: RADIOLOGY | Facility: HOSPITAL | Age: 30
End: 2025-07-06
Payer: COMMERCIAL

## 2025-07-06 ENCOUNTER — HOSPITAL ENCOUNTER (EMERGENCY)
Facility: HOSPITAL | Age: 30
Discharge: HOME | End: 2025-07-06
Payer: COMMERCIAL

## 2025-07-06 VITALS
SYSTOLIC BLOOD PRESSURE: 145 MMHG | WEIGHT: 215 LBS | OXYGEN SATURATION: 98 % | DIASTOLIC BLOOD PRESSURE: 81 MMHG | BODY MASS INDEX: 30.78 KG/M2 | HEIGHT: 70 IN | HEART RATE: 88 BPM | TEMPERATURE: 97 F | RESPIRATION RATE: 18 BRPM

## 2025-07-06 DIAGNOSIS — T14.90XA SPORTS INJURY: ICD-10-CM

## 2025-07-06 DIAGNOSIS — S93.402A SPRAIN OF LEFT ANKLE, INITIAL ENCOUNTER: Primary | ICD-10-CM

## 2025-07-06 PROCEDURE — 99284 EMERGENCY DEPT VISIT MOD MDM: CPT

## 2025-07-06 PROCEDURE — 73630 X-RAY EXAM OF FOOT: CPT | Mod: LEFT SIDE | Performed by: RADIOLOGY

## 2025-07-06 PROCEDURE — 73610 X-RAY EXAM OF ANKLE: CPT | Mod: LEFT SIDE | Performed by: RADIOLOGY

## 2025-07-06 PROCEDURE — 73630 X-RAY EXAM OF FOOT: CPT | Mod: LT

## 2025-07-06 PROCEDURE — 73610 X-RAY EXAM OF ANKLE: CPT | Mod: LT

## 2025-07-06 NOTE — ED PROVIDER NOTES
HPI   Chief Complaint   Patient presents with    Ankle Pain       30-year-old male presents complaining of left ankle pain secondary to a sports related injury.  The patient reports that he was playing basketball when he twisted his ankle.  He reports that he felt a pop and has had pain since.  He reports he has remained ambulatory.  Denies any weakness or paresthesias.              Patient History   Medical History[1]  Surgical History[2]  Family History[3]  Social History[4]    Physical Exam   ED Triage Vitals [07/06/25 1621]   Temperature Heart Rate Respirations BP   36.1 °C (97 °F) 88 18 145/81      Pulse Ox Temp src Heart Rate Source Patient Position   98 % -- -- --      BP Location FiO2 (%)     -- --       Physical Exam  Vitals and nursing note reviewed.   Constitutional:       General: He is not in acute distress.     Appearance: Normal appearance. He is normal weight. He is not ill-appearing, toxic-appearing or diaphoretic.   HENT:      Head: Normocephalic.      Nose: Nose normal.      Mouth/Throat:      Mouth: Mucous membranes are moist.   Eyes:      Extraocular Movements: Extraocular movements intact.      Conjunctiva/sclera: Conjunctivae normal.   Cardiovascular:      Rate and Rhythm: Normal rate and regular rhythm.      Pulses: Normal pulses.   Pulmonary:      Effort: Pulmonary effort is normal. No respiratory distress.      Breath sounds: Normal breath sounds.   Abdominal:      General: Abdomen is flat. There is no distension.      Palpations: Abdomen is soft.      Tenderness: There is no abdominal tenderness. There is no guarding or rebound.   Musculoskeletal:         General: Normal range of motion.      Cervical back: Normal range of motion and neck supple.      Comments: Tenderness on palpation to the left medial and lateral malleoli region.  There is also some mild tenderness on palpation to the left midfoot.  There is no tenderness to the midshaft of proximal left tibia/fibula.  Patient is  neurovascularly intact with soft compartments brisk cap refill and easily palpable distal pulses.  Patient has a negative Elias test.   Skin:     General: Skin is warm and dry.      Capillary Refill: Capillary refill takes less than 2 seconds.   Neurological:      General: No focal deficit present.      Mental Status: He is alert and oriented to person, place, and time.   Psychiatric:         Mood and Affect: Mood normal.         Behavior: Behavior normal.         Thought Content: Thought content normal.         Judgment: Judgment normal.           ED Course & MDM   ED Course as of 07/06/25 1830   Sun Jul 06, 2025 1829 IMPRESSION:  No acute osseous abnormality identified.   [DS]   1830 IMPRESSION:  No acute osseous abnormality identified.   [DS]      ED Course User Index  [DS] Stephen Nettles PA-C                 No data recorded     [place] Coma Scale Score: 15 (07/06/25 1623 : Guillermo Poon RN)                           Medical Decision Making    Medical Decision Making & ED Course  Medical Decision Making:  Patient was evaluated for an ankle injury.  He was found be neurovascularly intact on exam.  Imaging studies were obtained and revealed no evidence of acute fracture or dislocation.  Patient was notified of the findings verbalized understanding. I explained my concern for ankle sprain.  The patient will be placed in an Ace wrap and given crutches along with crutch training.  Recommended elevation and ice therapy.  Encouraged use of Tylenol as needed for pain.  --  Scoring Tools Utilized: None     Differential diagnoses considered include but are not limited to: Ankle sprain, foot sprain, metatarsal fracture     Social Determinants of Health which Significantly Impact Care: None identified The following actions were taken to address these social determinants: None    EKG Independent Interpretation: EKG not obtained    Independent Result Review and Interpretation: Ankle X-Ray as interpreted by me  revealed no fracture    Chronic conditions affecting the patient's care: None    The patient was discussed with the following consultants/services: None    Care Considerations: None    ED Course:     Disposition  As a result of the work-up, the patient was discharged home.  he was informed of his diagnosis and instructed to come back with any concerns or worsening of condition.  he and was agreeable to the plan as discussed above.  he was given the opportunity to ask questions.  All of the patient's questions were answered.      Patient was seen independently    Stephen Nettles PA-C  Emergency Medicine            Procedure  Procedures       [1] No past medical history on file.  [2] No past surgical history on file.  [3] No family history on file.  [4]   Social History  Tobacco Use    Smoking status: Never    Smokeless tobacco: Current   Substance Use Topics    Alcohol use: Yes    Drug use: Never        Stephen Nettles PA-C  07/06/25 2685